# Patient Record
Sex: MALE | Race: BLACK OR AFRICAN AMERICAN | Employment: UNEMPLOYED | URBAN - NONMETROPOLITAN AREA
[De-identification: names, ages, dates, MRNs, and addresses within clinical notes are randomized per-mention and may not be internally consistent; named-entity substitution may affect disease eponyms.]

---

## 2018-07-24 ENCOUNTER — TELEPHONE (OUTPATIENT)
Dept: CARDIOLOGY | Age: 28
End: 2018-07-24

## 2018-07-24 ENCOUNTER — HOSPITAL ENCOUNTER (OUTPATIENT)
Dept: NON INVASIVE DIAGNOSTICS | Age: 28
Discharge: HOME | End: 2018-07-24
Payer: COMMERCIAL

## 2018-07-24 ENCOUNTER — OFFICE VISIT (OUTPATIENT)
Dept: CARDIOLOGY | Age: 28
End: 2018-07-24
Payer: COMMERCIAL

## 2018-07-24 VITALS
HEART RATE: 66 BPM | WEIGHT: 315 LBS | BODY MASS INDEX: 44.1 KG/M2 | SYSTOLIC BLOOD PRESSURE: 130 MMHG | HEIGHT: 71 IN | DIASTOLIC BLOOD PRESSURE: 92 MMHG

## 2018-07-24 DIAGNOSIS — I26.99 PE (PULMONARY THROMBOEMBOLISM) (HCC): ICD-10-CM

## 2018-07-24 DIAGNOSIS — R00.0 TACHYCARDIA: Primary | ICD-10-CM

## 2018-07-24 PROBLEM — I82.4Y9 DEEP VEIN THROMBOSIS (DVT) OF PROXIMAL LOWER EXTREMITY (HCC): Status: ACTIVE | Noted: 2018-07-24

## 2018-07-24 PROBLEM — E66.09 OBESITY DUE TO EXCESS CALORIES: Status: ACTIVE | Noted: 2018-07-24

## 2018-07-24 PROCEDURE — 93000 ELECTROCARDIOGRAM COMPLETE: CPT | Performed by: INTERNAL MEDICINE

## 2018-07-24 PROCEDURE — 0296T PR EXT ECG > 48HR TO 21 DAY RCRD W/CONECT INTL RCRD: CPT | Performed by: INTERNAL MEDICINE

## 2018-07-24 PROCEDURE — 99203 OFFICE O/P NEW LOW 30 MIN: CPT | Performed by: INTERNAL MEDICINE

## 2018-07-24 PROCEDURE — 93229 REMOTE 30 DAY ECG TECH SUPP: CPT

## 2018-07-24 PROCEDURE — G8417 CALC BMI ABV UP PARAM F/U: HCPCS | Performed by: INTERNAL MEDICINE

## 2018-07-24 PROCEDURE — G8427 DOCREV CUR MEDS BY ELIG CLIN: HCPCS | Performed by: INTERNAL MEDICINE

## 2018-07-24 PROCEDURE — 1036F TOBACCO NON-USER: CPT | Performed by: INTERNAL MEDICINE

## 2018-07-24 ASSESSMENT — ENCOUNTER SYMPTOMS
CHEST TIGHTNESS: 0
NAUSEA: 0
SHORTNESS OF BREATH: 1
BACK PAIN: 0
BLOOD IN STOOL: 0
WHEEZING: 0
STRIDOR: 0
APNEA: 0
ABDOMINAL DISTENTION: 0
CHOKING: 0

## 2018-07-24 NOTE — TELEPHONE ENCOUNTER
Called patient and spoke to wife. In the appointment today we put a zio on him and directed him to wear it for 21 days as we thought this was the longest length you could wear the monitor. It can only be work for 14 days so I let wife know to take it off and send it in on day 14 which is August 7th and we will try a 30 day event monitor next if the Zio does not find anything.

## 2018-07-24 NOTE — PROGRESS NOTES
MRN: [id number] Todays Date: 2018  Last Appointment: Visit date not found  Chava Veliz is a 28 y.o. patient  : 1990  Referring Provider: No ref. provider found  PCP: Nia Reynolds MD    History of Present Illness:   27 y/o referred for evaluation of recurrent episodes of tachycardia. Experienced since childhood they typically are precipitated by stress and last up to 20 minutes. One recent episode was accompanied by marked numbness of his left upper extremity. 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 PEs occurred on Coumadin and Xarelto prompting present coverage with Lovenox. Has MCDONALD walking up one flight of steps. Denies chest pain at present. Risk Factors:  History reviewed. No pertinent family history. HTN: uncontrolled      Diabetes: No  none    Past Medical History:   Diagnosis Date    History of blood clots      Allergies   Allergen Reactions    Rocephin [Ceftriaxone]      Past Surgical History:   Procedure Laterality Date    TONSILLECTOMY AND ADENOIDECTOMY         History reviewed. No pertinent family history. Social History     Social History    Marital status:      Spouse name: N/A    Number of children: N/A    Years of education: N/A     Social History Main Topics    Smoking status: Former Smoker     Packs/day: 0.00    Smokeless tobacco: Never Used    Alcohol use No    Drug use: No    Sexual activity: Not Asked     Other Topics Concern    None     Social History Narrative    None         Current Outpatient Prescriptions:     enoxaparin (LOVENOX) 150 MG/ML injection, Inject 1 mg/kg into the skin every 12 hours, Disp: , Rfl:     Review of Systems   Constitutional: Negative for activity change, diaphoresis, fatigue and unexpected weight change.         27 y/o referred for evaluation of recurrent episodes of tachycardia with additional history of recurrent PEs   HENT: Negative for congestion, dental has no rales. He exhibits no tenderness. Abdominal: Soft. Bowel sounds are normal. He exhibits no distension and no mass. There is no tenderness. There is no rebound and no guarding. Musculoskeletal: He exhibits no deformity. Neurological: He is alert and oriented to person, place, and time. Skin: Skin is warm. No rash noted. He is not diaphoretic. No erythema. No pallor. Psychiatric: He has a normal mood and affect. His behavior is normal. Judgment and thought content normal.   Vitals reviewed. Orders Placed This Encounter   Procedures    EKG 12 lead     Order Specific Question:   Reason for Exam?     Answer: Other     No orders of the defined types were placed in this encounter. ECG interpretation from today:   Normal sinus rhythm with right axis deviation. Assessment / Plan:  History most consistent with SVT. Will obtain ECHO and month-long Zio monitor in pursuit. Pulmonary hypertension uncommon with a PE but recurrent events and right axis deviation raise that concern. ECHO will give us an estimate of his right-sided pressures.        Electronically sign by Sheilda Lombard, MD 7/24/2018 10:01 AM

## 2018-07-24 NOTE — LETTER
Dear Mehran Herbert MD,    Thank you for allowing me to participate in the care of Mr. Doug Pulliam. He presents today at the [address] [place] [address] in the [facility]. 27 y/o referred for evaluation of recurrent episodes of tachycardia. Experienced since childhood they typically are precipitated by stress and last up to 20 minutes. One recent episode was accompanied by marked numbness of his left upper extremity. 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 PEs occurred on Coumadin and Xarelto prompting present coverage with Lovenox. Has MCDONALD walking up one flight of steps. Denies chest pain at present. Patient Active Problem List   Diagnosis    Deep vein thrombosis (DVT) of proximal lower extremity (HCC)    PE (pulmonary thromboembolism) ([address]    Obesity due to excess calories    Tachycardia         History most consistent with SVT. Will obtain ECHO and month-long Zio monitor in pursuit. Pulmonary hypertension uncommon with a PE but recurrent events and right axis deviation raise that concern. ECHO will give us an estimate of his right-sided pressures.        Sincerely yours,    Elizabeth Cramer MD  [facility]

## 2018-07-24 NOTE — PATIENT INSTRUCTIONS
[place] at the [address] [place] [address] and [address] [facility] located on the first floor of [place] [address] through hospital main entrance and turn immediately to your left. Date/Time: Primo Prom Aug 9th  Arrive at 2:30 PM for 3 PM APT    Patient's contact number:  [phone] (home)     Echocardiogram -  No prep. Takes approximately 30 min. An echocardiogram uses sound waves to produce images of your heart. This commonly used test allows your doctor to see how your heart is beating and pumping blood. Your doctor can use the images from an echocardiogram to identify various abnormalities in the heart muscle and valves. This test has 2 parts:   Ø You will be asked to disrobe from the waist up and given a gown to wear. The technologist will then hook up an EKG monitor to you for the entire exam.   Ø You will then have an ultrasound of your heart (echocardiogram) to assess the heart muscle, heart valves and heart function. You may eat and take any medicines before the exam.     If you need to change your appointment, please call outpatient scheduling at [phone].

## 2018-08-15 ENCOUNTER — TELEPHONE (OUTPATIENT)
Dept: CARDIOLOGY | Age: 28
End: 2018-08-15

## 2018-09-21 ENCOUNTER — TELEPHONE (OUTPATIENT)
Dept: CARDIOLOGY | Age: 28
End: 2018-09-21

## 2018-09-21 NOTE — TELEPHONE ENCOUNTER
Attempted to call patient to inquire about echo not being completed but was not successful because his phone was out of minutes. The patient had a previous appointment for an echo follow up, never got the echo, then showed up to the appointment which was cancelled because his echo was never done and we could not reach the patient. It seems this has happened again this time around. He had an appointment to get his echo done and the patient did not show up for the test. He also wore a Zio on his first appointment which was disconnected and never mailed in. Will inform the provider of this.

## 2018-09-25 ENCOUNTER — TELEPHONE (OUTPATIENT)
Dept: CARDIOLOGY | Age: 28
End: 2018-09-25